# Patient Record
Sex: MALE
[De-identification: names, ages, dates, MRNs, and addresses within clinical notes are randomized per-mention and may not be internally consistent; named-entity substitution may affect disease eponyms.]

---

## 2022-11-27 ENCOUNTER — HOSPITAL ENCOUNTER (OUTPATIENT)
Dept: HOSPITAL 95 - LAB SHORT | Age: 42
End: 2022-11-27
Payer: COMMERCIAL

## 2022-11-27 DIAGNOSIS — K52.9: Primary | ICD-10-CM

## 2023-04-10 ENCOUNTER — HOSPITAL ENCOUNTER (OUTPATIENT)
Dept: HOSPITAL 95 - ORSCSDS | Age: 43
Discharge: HOME | End: 2023-04-10
Attending: STUDENT IN AN ORGANIZED HEALTH CARE EDUCATION/TRAINING PROGRAM
Payer: COMMERCIAL

## 2023-04-10 VITALS — HEIGHT: 72 IN | BODY MASS INDEX: 35.35 KG/M2 | WEIGHT: 261.03 LBS

## 2023-04-10 VITALS — SYSTOLIC BLOOD PRESSURE: 115 MMHG | DIASTOLIC BLOOD PRESSURE: 77 MMHG

## 2023-04-10 DIAGNOSIS — N40.0: ICD-10-CM

## 2023-04-10 DIAGNOSIS — R13.14: ICD-10-CM

## 2023-04-10 DIAGNOSIS — K29.70: ICD-10-CM

## 2023-04-10 DIAGNOSIS — Z87.891: ICD-10-CM

## 2023-04-10 DIAGNOSIS — K20.90: ICD-10-CM

## 2023-04-10 DIAGNOSIS — Z79.899: ICD-10-CM

## 2023-04-10 DIAGNOSIS — R19.7: Primary | ICD-10-CM

## 2023-04-10 DIAGNOSIS — E66.9: ICD-10-CM

## 2023-04-10 DIAGNOSIS — K64.4: ICD-10-CM

## 2023-04-10 DIAGNOSIS — K29.80: ICD-10-CM

## 2023-04-10 DIAGNOSIS — K62.5: ICD-10-CM

## 2023-04-10 DIAGNOSIS — G47.33: ICD-10-CM

## 2023-04-10 DIAGNOSIS — K44.9: ICD-10-CM

## 2023-04-10 DIAGNOSIS — R10.84: ICD-10-CM

## 2023-04-10 PROCEDURE — 0DB98ZX EXCISION OF DUODENUM, VIA NATURAL OR ARTIFICIAL OPENING ENDOSCOPIC, DIAGNOSTIC: ICD-10-PCS | Performed by: STUDENT IN AN ORGANIZED HEALTH CARE EDUCATION/TRAINING PROGRAM

## 2023-04-10 PROCEDURE — 0DBN8ZX EXCISION OF SIGMOID COLON, VIA NATURAL OR ARTIFICIAL OPENING ENDOSCOPIC, DIAGNOSTIC: ICD-10-PCS | Performed by: STUDENT IN AN ORGANIZED HEALTH CARE EDUCATION/TRAINING PROGRAM

## 2023-04-10 PROCEDURE — 0DBE8ZX EXCISION OF LARGE INTESTINE, VIA NATURAL OR ARTIFICIAL OPENING ENDOSCOPIC, DIAGNOSTIC: ICD-10-PCS | Performed by: STUDENT IN AN ORGANIZED HEALTH CARE EDUCATION/TRAINING PROGRAM

## 2023-04-10 PROCEDURE — 0DB58ZX EXCISION OF ESOPHAGUS, VIA NATURAL OR ARTIFICIAL OPENING ENDOSCOPIC, DIAGNOSTIC: ICD-10-PCS | Performed by: STUDENT IN AN ORGANIZED HEALTH CARE EDUCATION/TRAINING PROGRAM

## 2023-04-10 PROCEDURE — 0DB78ZX EXCISION OF STOMACH, PYLORUS, VIA NATURAL OR ARTIFICIAL OPENING ENDOSCOPIC, DIAGNOSTIC: ICD-10-PCS | Performed by: STUDENT IN AN ORGANIZED HEALTH CARE EDUCATION/TRAINING PROGRAM

## 2023-04-10 NOTE — NUR
04/10/23 1320 Liset Recinos
4% LIDOCAINE NEBULIZER ADMINISTERED PRIOR TO ENDOSCOPY PER DR. CRISTOBAL.